# Patient Record
Sex: FEMALE | Race: WHITE | NOT HISPANIC OR LATINO | Employment: STUDENT | URBAN - METROPOLITAN AREA
[De-identification: names, ages, dates, MRNs, and addresses within clinical notes are randomized per-mention and may not be internally consistent; named-entity substitution may affect disease eponyms.]

---
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
## 2021-04-29 ENCOUNTER — PATIENT MESSAGE (OUTPATIENT)
Dept: RESEARCH | Facility: HOSPITAL | Age: 21
End: 2021-04-29

## 2022-07-29 ENCOUNTER — TELEPHONE (OUTPATIENT)
Dept: NEUROLOGY | Facility: CLINIC | Age: 22
End: 2022-07-29
Payer: COMMERCIAL

## 2022-07-29 NOTE — TELEPHONE ENCOUNTER
Spoke with pts mother and scheduled new pt appointment for headaches. Date/time and location verbalized. Pt understanding.---- Message from Jennifer Meyer Patient Care Assistant sent at 7/29/2022  2:05 PM CDT -----  Regarding: appointment  Contact: pt's mother  Type:  Sooner Appointment Request    Caller is requesting a sooner appointment.  Caller declined first available appointment listed below.  Caller will not accept being placed on the waitlist and is requesting a message be sent to doctor.    Name of Caller:  pt's mother   When is the first available appointment?  2022  Symptoms:  new pt est care - headaches   Best Call Back Number:  [phone] (home)     Additional Information:  please call to advise. Thanks!

## 2022-08-18 ENCOUNTER — OFFICE VISIT (OUTPATIENT)
Dept: NEUROLOGY | Facility: CLINIC | Age: 22
End: 2022-08-18
Payer: COMMERCIAL

## 2022-08-18 VITALS
SYSTOLIC BLOOD PRESSURE: 97 MMHG | BODY MASS INDEX: 18.56 KG/M2 | DIASTOLIC BLOOD PRESSURE: 66 MMHG | HEART RATE: 93 BPM | RESPIRATION RATE: 17 BRPM | HEIGHT: 64 IN | WEIGHT: 108.69 LBS

## 2022-08-18 DIAGNOSIS — F32.A DEPRESSION, UNSPECIFIED DEPRESSION TYPE: ICD-10-CM

## 2022-08-18 DIAGNOSIS — G43.019 INTRACTABLE MIGRAINE WITHOUT AURA AND WITHOUT STATUS MIGRAINOSUS: Primary | ICD-10-CM

## 2022-08-18 PROCEDURE — 3078F PR MOST RECENT DIASTOLIC BLOOD PRESSURE < 80 MM HG: ICD-10-PCS | Mod: CPTII,S$GLB,, | Performed by: PSYCHIATRY & NEUROLOGY

## 2022-08-18 PROCEDURE — 3078F DIAST BP <80 MM HG: CPT | Mod: CPTII,S$GLB,, | Performed by: PSYCHIATRY & NEUROLOGY

## 2022-08-18 PROCEDURE — 1159F PR MEDICATION LIST DOCUMENTED IN MEDICAL RECORD: ICD-10-PCS | Mod: CPTII,S$GLB,, | Performed by: PSYCHIATRY & NEUROLOGY

## 2022-08-18 PROCEDURE — 1160F RVW MEDS BY RX/DR IN RCRD: CPT | Mod: CPTII,S$GLB,, | Performed by: PSYCHIATRY & NEUROLOGY

## 2022-08-18 PROCEDURE — 3074F SYST BP LT 130 MM HG: CPT | Mod: CPTII,S$GLB,, | Performed by: PSYCHIATRY & NEUROLOGY

## 2022-08-18 PROCEDURE — 99214 OFFICE O/P EST MOD 30 MIN: CPT | Mod: S$GLB,,, | Performed by: PSYCHIATRY & NEUROLOGY

## 2022-08-18 PROCEDURE — 1159F MED LIST DOCD IN RCRD: CPT | Mod: CPTII,S$GLB,, | Performed by: PSYCHIATRY & NEUROLOGY

## 2022-08-18 PROCEDURE — 3008F BODY MASS INDEX DOCD: CPT | Mod: CPTII,S$GLB,, | Performed by: PSYCHIATRY & NEUROLOGY

## 2022-08-18 PROCEDURE — 99999 PR PBB SHADOW E&M-EST. PATIENT-LVL IV: ICD-10-PCS | Mod: PBBFAC,,, | Performed by: PSYCHIATRY & NEUROLOGY

## 2022-08-18 PROCEDURE — 99999 PR PBB SHADOW E&M-EST. PATIENT-LVL IV: CPT | Mod: PBBFAC,,, | Performed by: PSYCHIATRY & NEUROLOGY

## 2022-08-18 PROCEDURE — 99214 PR OFFICE/OUTPT VISIT, EST, LEVL IV, 30-39 MIN: ICD-10-PCS | Mod: S$GLB,,, | Performed by: PSYCHIATRY & NEUROLOGY

## 2022-08-18 PROCEDURE — 1160F PR REVIEW ALL MEDS BY PRESCRIBER/CLIN PHARMACIST DOCUMENTED: ICD-10-PCS | Mod: CPTII,S$GLB,, | Performed by: PSYCHIATRY & NEUROLOGY

## 2022-08-18 PROCEDURE — 3074F PR MOST RECENT SYSTOLIC BLOOD PRESSURE < 130 MM HG: ICD-10-PCS | Mod: CPTII,S$GLB,, | Performed by: PSYCHIATRY & NEUROLOGY

## 2022-08-18 PROCEDURE — 3008F PR BODY MASS INDEX (BMI) DOCUMENTED: ICD-10-PCS | Mod: CPTII,S$GLB,, | Performed by: PSYCHIATRY & NEUROLOGY

## 2022-08-18 RX ORDER — ZOLMITRIPTAN 5 MG/1
5 TABLET, ORALLY DISINTEGRATING ORAL
Qty: 9 TABLET | Refills: 3 | Status: SHIPPED | OUTPATIENT
Start: 2022-08-18 | End: 2022-11-23

## 2022-08-18 RX ORDER — ZOLMITRIPTAN 5 MG/1
5 TABLET, ORALLY DISINTEGRATING ORAL
Qty: 9 TABLET | Refills: 3 | Status: SHIPPED | OUTPATIENT
Start: 2022-08-18 | End: 2022-08-18 | Stop reason: SDUPTHER

## 2022-08-18 RX ORDER — ONDANSETRON 4 MG/1
4 TABLET, ORALLY DISINTEGRATING ORAL EVERY 6 HOURS PRN
Qty: 15 TABLET | Refills: 5 | Status: SHIPPED | OUTPATIENT
Start: 2022-08-18 | End: 2022-11-23

## 2022-08-18 RX ORDER — CYPROHEPTADINE HYDROCHLORIDE 4 MG/1
4 TABLET ORAL NIGHTLY
Qty: 30 TABLET | Refills: 5 | Status: SHIPPED | OUTPATIENT
Start: 2022-08-18 | End: 2022-11-23

## 2022-08-18 RX ORDER — LEVONORGESTREL/ETHINYL ESTRADIOL 2.6; 2.3 MG/1; MG/1
PATCH TRANSDERMAL
COMMUNITY
End: 2022-11-23

## 2022-08-18 NOTE — PATIENT INSTRUCTIONS
1- For preventive management: A. Start cyproheptadine 2mg nightly x 3 days, then increase to 4mg nightly           B. Can consider adding magnesium 250mg to 500mg daily (this can lead to looser and more frequent bowel movements, so magnesium glycinate is the best tolerated formulation).     2- Acute management: Zolmitriptan 5mg at onset of attack, can repeat after 2hours. Max 2/day. No more than 2 days  A week or 10 days a month  If nauseated add zofran ODT 4mg  To the zolmitriptan.

## 2022-08-18 NOTE — PROGRESS NOTES
[facility]    Chief complaint: headache   Referred by: Milton Self  No address on file     History of Present Illness:    21Y RH F anxiety, depression, headache, tic disorder who presents for initial evaluation of headache.     Headache history  Age at onset and course over time: around 1-2 years ago began, began infrequently and over time it's increased.   Location: usually right eye, temporal, back of head   Migraine typically starts in the evening around 5pm, she will have right eye typically, then temple and occipital area , if that happens at work might have to leave, feels nauseated, sensitive to light, she needs to lay down and can't function because of the migraine, falling asleep during the attack is very difficult for her she will take 500mg APAP and if wants to sleep tylenol 1 gram will really make her sleepy. It takes at least 1 hour until attacks starts to be more disabiling. She gets quite nauseated during the attacks. Sometimes not as many severe symptoms.   Character: throbbing/pounding, sharp, pressure, stabbing   Intensity:  7-9/10 in intensity, currently 0/10   Frequency:1-2 days a week at most  Typically 6-8 attacks disabling per month  Sometimes can have milder headache, but not as frequently  Headache free 24 days a month   Duration: >4 hours untreated, has to lay down and will sleep about 10-12 hours and then wake with it resolved  Aura: none   Prodrome: head feels fuzzy, starts feeling unwell, can be hours before the attack  Associated symptoms: photophobia, phonophobia, osmophobia, kinesiophobia, neck tightness/pain, nausea  Other neurologic symptoms: dizziness, blurry vision, mood changes, concentration issues, problems with memory/task completion/relaxation  Post drome - fatigue, difficulty concentrating.   Precipitating factors: sleep deprivation, light, noise, strong smells, weather changes, stress  Alleviating factors: sleep, darkness,heat,   medications  Aggravating factors: exposure to light, sound, smells, bending over,stress  No TVOs  No ringing in ears  No positional component   Family history of headache:  Mother has migraine   ER/UC visits: 0  Caffeine: once, just have a few sips of an energy drink to stay awake   GYN: twirla patch   Sleep: unrefreshed sleep     Medication history:  Acute:  APAP 1-2 - no improvement.     Preventive:  Fluoxetine 10mg - no benefit     Low bps at baseline- will avoid antihypertensives  Will avoid TPX given risk of weight loss worsening mood.     Neuroimaging and other studies:   No results found for this or any previous visit.      ROS: The fourteen point review of system was performed.   Constitutional:  Denies fevers/cold or heat intolerance, weight loss/gain or fatigue.  Eyes:                        Denies diplopia, ptosis, visual field defects or ocular disease   excepting any listed above.  ENT:   Denies hearing loss, infection, carcinoma or other diseases excepting any listed above.   Cardiovascular:  Denies stroke, CAD, arrhythmia, CHF or other disease excepting any listed above.  Pulmonary:  Denies dyspnea, COPD, RAD or infection or neoplasm excepting any listed above.  Gastrointestinal: Denies ulcer disease, liver or other disease excepting any listed above.  Skin:   Denies rash, skin cancer, or other cutaneous disorder excepting any listed above.  Neurological:  See HPI  Musculoskeletal: Denies RA, fractures or other joint or skeletal problems excepting any listed above.  Heme/Lymphatic: Denies any blood or lymph system neoplasm or disorder excepting any listed above.  Endocrine:  Denies any thyroid or other disorders, excepting any listed above.  Allergic/Immuno: Denies any autoimmune disease or allergy excepting any listed above.  Psychiatric:  Denies any disorder or treatment excepting any listed above.  Urologic:  Denies any difficulties with the urinary system or sexual function except noted  above.    Past Medical History:   Diagnosis Date    Anxiety     Depression     Headache     Tic disorder        No past surgical history on file.      Family History   Problem Relation Age of Onset    Breast cancer Mother     Anxiety disorder Father     No Known Problems Maternal Grandmother     Colon cancer Maternal Grandfather     Heart disease Paternal Grandmother     Diabetes Paternal Grandfather      Social History     Tobacco Use    Smoking status: Never Smoker    Smokeless tobacco: Never Used   Substance Use Topics    Alcohol use: No     Comment: occasionally    Drug use: No         Review of patient's allergies indicates:   Allergen Reactions    Ketoconazole Itching    Motrin [ibuprofen] Swelling    Penicillins Rash         Current Outpatient Medications:     levonorgestreL-ethinyl estrad (TWIRLA) 120-30 mcg/24 hr PTWK, Twirla 120 mcg-30 mcg/24 hr transdermal patch  APPLY 1 PATCH EVERY WEEK BY TRANSDERMAL ROUTE FOR 21 DAYS., Disp: , Rfl:     ciclopirox 1 % shampoo, SMARTSIG:Topical 2-3 Times Weekly, Disp: , Rfl:     FLUoxetine 10 MG capsule, Take 1 capsule (10 mg total) by mouth once daily. (Patient not taking: Reported on 8/18/2022), Disp: 30 capsule, Rfl: 1    medroxyPROGESTERone (DEPO-PROVERA) 150 mg/mL Syrg, medroxyprogesterone 150 mg/mL intramuscular syringe  INJECT 1 ML INTRAMUSCULARLY EVERY 3 MONTHS, Disp: , Rfl:     omeprazole 20 mg TbEC, Take 20 mg by mouth once daily. (Patient not taking: Reported on 8/18/2022), Disp: , Rfl:       PHYSICAL EXAMINATION  Vitals:    08/18/22 1008   BP: 97/66   Pulse: 93   Resp: 17     General: The patient is a well-developed, well-nourished looking of stated age in no acute distress.  Head: Normocephalic, atraumatic  Eyes, ears, nose and throat: normal.  Neck: Supple  Cardiovascular:  No carotid bruits.  Regular rate and rhythm.   No ttp pericranial   NEUROLOGIC EXAM:  MENTAL: The patient is awake, alert and oriented times to time, place,  location and situation. Intact recent memory, attention, concentration. Language and speech are normal. No aphasia, no dysarthria  CRANIAL NERVES:   CN II: visual fields are intact to confrontation with no defects;  funduscopic examination is within normal limits without evidence of papilledema and signs of venous pulsations.  Pupils are equal, round and reactive to light and accommodation.    III, IV and VI: extraocular movements are intact to all directions of gaze with normal convergence.  V: facial sensation is intact to light touch in divisions V1 through V3.    VII: Facial muscle strength is intact to eyebrow raising, forceful eyelid closure, and cheek puffing.    VIII: Hearing acuity is intact to finger rub.  IX, X: palate rises symmetrically and uvula midline.    XI: Sternocleidomastoid and trapezius strength are 5/5 bilaterally.    XII: Tongue protrudes midline without atrophy or fasciculations.   MOTOR EXAM: No atrophy or fasciculations are present. No pronator drift,  shows normal strength ( 5/5 strength )in all 4 extremities. Tone is normal. SENSORY EXAM: intact pinprick, light touch, vibration, and position bilaterally.  CEREBELLAR EXAM: shows normal finger-to-nose and heel-to-shin.   DEEP TENDON REFLEXES:  +2 in brachioradialis, biceps, triceps, knee jerks, ankle jerks, downgoing toes b/l. No abnormal reflexes are present.  GAIT/STANCE: Romberg Negative.  Standard gait was normal with normal stride, arm swing and turning.  Normal heel and toe walking and tandem gait.       Impression:  Migraine without aura, episodic- family history of migraine in mother,  1-2 years ago now, began infrequently and over time has increased currently having 6-8 attacks/month, they typically last untreated over 4 hours. She has not found any benefit from fluoxetine used for depression. Neurological examination did show any focal deficits. Given 6-8 disabling attacks per month we will start prevention. After discussion we  will avoid tpx given the risk of significant weight loss on it, aggravating depression, Bps do not allow for antihypertensive. She is already on fluoxetine thus will avoid adding other antidepressants. We discussed options and will prescribe cyproheptadine and given zolmitriptan pRN for migraine actue management.  In today's clinic visit we provided patient with education on their headache diagnosis, pathophysiology, triggers, aggravating and improving factors, risk factors for chronification of migraine, preventive management, non-pharmacological management and proper use of acute management medications.  Patient expressed understanding and all questions were answered. We discussed lifestyle modifications including healthy regular eating, avoiding dehydration, avoiding more than 1 caffeinated product a day, establishing routing sleep patterns at least 8 hours of sleep and avoiding oversleeping and working on relaxation and stressors.     Comorbidities:  Depression  Followed by PCP     Plan:   1- For preventive management: A. Start cyproheptadine 2mg nightly x 3 days, then increase to 4mg nightly           B. Can consider adding magnesium 250mg to 500mg daily (this can lead to looser and more frequent bowel movements, so magnesium glycinate is the best tolerated formulation).     2- Acute management: Zolmitriptan 5mg at onset of attack, can repeat after 2hours. Max 2/day. No more than 2 days  A week or 10 days a month  If nauseated add zofran ODT 4mg  To the zolmitriptan.    RTC in 2 months        Luz Burgess MD   Board Certified Neurologist  [facility] Certified Headache Medicine

## 2022-09-11 ENCOUNTER — PATIENT MESSAGE (OUTPATIENT)
Dept: NEUROLOGY | Facility: CLINIC | Age: 22
End: 2022-09-11
Payer: COMMERCIAL

## 2022-09-13 NOTE — TELEPHONE ENCOUNTER
Ok to write excuse for that day, but let her know that we typically do not write for excuses like this when we have not seen patient.     Dr. Moser

## 2022-10-04 ENCOUNTER — PATIENT MESSAGE (OUTPATIENT)
Dept: NEUROLOGY | Facility: CLINIC | Age: 22
End: 2022-10-04
Payer: COMMERCIAL

## 2022-11-23 PROBLEM — F41.0 PANIC ATTACK DUE TO POST TRAUMATIC STRESS DISORDER (PTSD): Status: ACTIVE | Noted: 2022-11-23

## 2022-11-23 PROBLEM — F43.10 PANIC ATTACK DUE TO POST TRAUMATIC STRESS DISORDER (PTSD): Status: ACTIVE | Noted: 2022-11-23

## 2022-11-23 PROBLEM — G43.909 MIGRAINE: Status: ACTIVE | Noted: 2022-10-10
